# Patient Record
Sex: MALE | Race: WHITE | NOT HISPANIC OR LATINO | Employment: STUDENT | URBAN - METROPOLITAN AREA
[De-identification: names, ages, dates, MRNs, and addresses within clinical notes are randomized per-mention and may not be internally consistent; named-entity substitution may affect disease eponyms.]

---
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
## 2022-07-20 ENCOUNTER — OFFICE VISIT (OUTPATIENT)
Dept: FAMILY MEDICINE CLINIC | Facility: CLINIC | Age: 22
End: 2022-07-20
Payer: COMMERCIAL

## 2022-07-20 VITALS
TEMPERATURE: 97.3 F | DIASTOLIC BLOOD PRESSURE: 78 MMHG | HEIGHT: 73 IN | WEIGHT: 242 LBS | BODY MASS INDEX: 32.07 KG/M2 | HEART RATE: 76 BPM | SYSTOLIC BLOOD PRESSURE: 116 MMHG | OXYGEN SATURATION: 97 %

## 2022-07-20 DIAGNOSIS — F41.9 ANXIETY: Primary | ICD-10-CM

## 2022-07-20 DIAGNOSIS — R53.83 OTHER FATIGUE: ICD-10-CM

## 2022-07-20 DIAGNOSIS — F33.0 MILD EPISODE OF RECURRENT MAJOR DEPRESSIVE DISORDER (HCC): ICD-10-CM

## 2022-07-20 DIAGNOSIS — Z13.220 SCREENING, LIPID: ICD-10-CM

## 2022-07-20 DIAGNOSIS — Z13.1 SCREENING FOR DIABETES MELLITUS: ICD-10-CM

## 2022-07-20 DIAGNOSIS — Z23 NEED FOR TDAP VACCINATION: ICD-10-CM

## 2022-07-20 PROBLEM — J45.20 MILD INTERMITTENT ASTHMA: Status: RESOLVED | Noted: 2017-07-11 | Resolved: 2022-07-20

## 2022-07-20 PROBLEM — J45.20 MILD INTERMITTENT ASTHMA: Status: ACTIVE | Noted: 2017-07-11

## 2022-07-20 PROCEDURE — 90471 IMMUNIZATION ADMIN: CPT

## 2022-07-20 PROCEDURE — 90715 TDAP VACCINE 7 YRS/> IM: CPT

## 2022-07-20 PROCEDURE — 99204 OFFICE O/P NEW MOD 45 MIN: CPT | Performed by: FAMILY MEDICINE

## 2022-07-20 RX ORDER — FLUOXETINE HYDROCHLORIDE 20 MG/1
20 CAPSULE ORAL DAILY
Qty: 30 CAPSULE | Refills: 1 | Status: SHIPPED | OUTPATIENT
Start: 2022-07-20 | End: 2022-09-13

## 2022-07-20 NOTE — PROGRESS NOTES
Wilfred Esquivelt 2000 male MRN: [id number]      ASSESSMENT/PLAN  Problem List Items Addressed This Visit        Other    Anxiety - Primary    Relevant Medications    FLUoxetine (PROzac) 20 mg capsule    Other Relevant Orders    TSH, 3rd generation with Free T4 reflex    Ambulatory Referral to [facility]    Mild episode of recurrent major depressive disorder (HCC)    Relevant Medications    FLUoxetine (PROzac) 20 mg capsule    Other Relevant Orders    TSH, 3rd generation with Free T4 reflex    Ambulatory Referral to [facility]      Other Visit Diagnoses     Screening for diabetes mellitus        Relevant Orders    Comprehensive metabolic panel    Screening, lipid        Relevant Orders    Lipid panel    Other fatigue        Relevant Orders    CBC and differential    Need for Tdap vaccination        Relevant Orders    TDAP VACCINE GREATER THAN OR EQUAL TO 8YO IM (Completed)        Reviewed various options for management including therapy and medication  Pt agreeable to both  Refer to in office LCSW, Start Prozac  Discussed possible ADRs including GI upset, somnolence, initial worsening of symptoms  Reviewed delayed onset to max therapeutic potential  F/u in 4 weeks to monitor, to call if not tolerating prior  Depression Screening Follow-up Plan: Patient's depression screening was positive with a PHQ-2 score of   Their PHQ-9 score was 22  Patient assessed for underlying major depression  They have no active suicidal ideations  Brief counseling provided and recommend additional follow-up/re-evaluation next office visit  Pt denies any suicidal plans/attempts  Has thoughts due to feeling "worthless" due to not having job/spending his brother's money  Check TSH       BP WNL   CMP + Lipids to screen for DM, HLD   Defers STD screenings   Vaccinations: HPV -- thinking about it, TDap given, COVID UTD   Encouraged regular physical activity, varied diet, and regular dental/eye exams Future Appointments   Date Time Provider Matheus Bassett   8/11/2022  3:00 PM [address] [facility]   8/17/2022 11:00 AM DO [facility]          SUBJECTIVE  CC: Annual Exam      HPI:  Rajeev Mcbride is a 20 y o  male who presents with Mom to establish care  History reviewed and updated as below  Symptoms of depression/anxiety have been present for some time, but seem to worsening over the past 3 months   Sleeping a lot, poor motivation, socially isolating, irritable, having panic attacks (heart racing, fidgeting/tremors, abdominal cramping/vomiting)   Was previously diagnosed with ADHD and was on medications -- didn't note any improvement in school performance   Also had behavioral issues, Mom didn't feel safe -- had to move in with friend and his parents for about 1 year, tried to move back with parents again, but behavioral issues continued, so he moved out again   Mom feels he had an addiction to XBox   Has not been able to keep a job for [employer]   Used up his savings to maintain an old car, had fines due to not keeping registration/inspection up to date     Review of Systems   Constitutional: Negative for unexpected weight change  HENT: Negative for congestion, ear pain, rhinorrhea and sore throat  Eyes: Negative for visual disturbance  Respiratory: Negative for cough and shortness of breath  Cardiovascular: Positive for palpitations  Negative for chest pain and leg swelling  Gastrointestinal: Positive for vomiting (with panic attacks )  Negative for abdominal pain, constipation and diarrhea  Endocrine: Negative for polyuria  Genitourinary: Negative for dysuria  Neurological: Negative for dizziness and headaches  Psychiatric/Behavioral: Negative for sleep disturbance   The patient is nervous/anxious          (+) poor motivation  (+) irritable  (+) social isolation       Historical Information   The patient history was reviewed and updated as follows:    History reviewed  No pertinent past medical history  Past Surgical History:   Procedure Laterality Date    ADENOIDECTOMY      TYMPANOSTOMY TUBE PLACEMENT       Family History   Adopted: Yes   Problem Relation Age of Onset    Depression Mother     Anxiety disorder Mother       Social History   Social History     Substance and Sexual Activity   Alcohol Use Not Currently     Social History     Substance and Sexual Activity   Drug Use Yes    Types: Marijuana     Social History     Tobacco Use   Smoking Status Former Smoker   Smokeless Tobacco Never Used       Medications:     Current Outpatient Medications:     FLUoxetine (PROzac) 20 mg capsule, Take 1 capsule (20 mg total) by mouth daily, Disp: 30 capsule, Rfl: 1  No Known Allergies    OBJECTIVE    Vitals:   Vitals:    07/20/22 1421   BP: 116/78   BP Location: Left arm   Patient Position: Sitting   Cuff Size: Large   Pulse: 76   Temp: (!) 97 3 °F (36 3 °C)   SpO2: 97%   Weight: 110 kg (242 lb)   Height: 6' 1" (1 854 m)           Physical Exam  Vitals and nursing note reviewed  Constitutional:       General: He is not in acute distress  Appearance: Normal appearance  HENT:      Head: Normocephalic and atraumatic  Right Ear: Tympanic membrane, ear canal and external ear normal       Left Ear: Tympanic membrane, ear canal and external ear normal       Nose: Nose normal       Mouth/Throat:      Mouth: Mucous membranes are moist       Pharynx: No oropharyngeal exudate or posterior oropharyngeal erythema  Eyes:      Conjunctiva/sclera: Conjunctivae normal    Cardiovascular:      Rate and Rhythm: Normal rate and regular rhythm  Pulmonary:      Effort: Pulmonary effort is normal  No respiratory distress  Breath sounds: Normal breath sounds  Abdominal:      General: Bowel sounds are normal  There is no distension  Palpations: Abdomen is soft  Tenderness: There is no abdominal tenderness     Musculoskeletal:      Right lower leg: No edema  Left lower leg: No edema  Lymphadenopathy:      Cervical: No cervical adenopathy  Skin:     General: Skin is warm and dry  Neurological:      General: No focal deficit present  Mental Status: He is alert     Psychiatric:         Mood and Affect: Mood normal                     Elan Giovanny, DO  [facility]  [address] [facility]   7/20/2022  4:04 PM

## 2022-07-27 ENCOUNTER — TELEPHONE (OUTPATIENT)
Dept: PSYCHIATRY | Facility: CLINIC | Age: 22
End: 2022-07-27

## 2022-07-27 DIAGNOSIS — E83.52 HYPERCALCEMIA: ICD-10-CM

## 2022-07-27 DIAGNOSIS — R79.89 ELEVATED SERUM CREATININE: Primary | ICD-10-CM

## 2022-07-27 LAB
ALBUMIN SERPL-MCNC: 4.8 G/DL (ref 4.1–5.2)
ALBUMIN/GLOB SERPL: 1.8 {RATIO} (ref 1.2–2.2)
ALP SERPL-CCNC: 64 IU/L (ref 44–121)
ALT SERPL-CCNC: 18 IU/L (ref 0–44)
AST SERPL-CCNC: 15 IU/L (ref 0–40)
BASOPHILS # BLD AUTO: 0.1 X10E3/UL (ref 0–0.2)
BASOPHILS NFR BLD AUTO: 1 %
BILIRUB SERPL-MCNC: 0.9 MG/DL (ref 0–1.2)
BUN SERPL-MCNC: 12 MG/DL (ref 6–20)
BUN/CREAT SERPL: 9 (ref 9–20)
CALCIUM SERPL-MCNC: 10.3 MG/DL (ref 8.7–10.2)
CHLORIDE SERPL-SCNC: 99 MMOL/L (ref 96–106)
CHOLEST SERPL-MCNC: 123 MG/DL (ref 100–199)
CO2 SERPL-SCNC: 24 MMOL/L (ref 20–29)
CREAT SERPL-MCNC: 1.28 MG/DL (ref 0.76–1.27)
EGFR: 81 ML/MIN/1.73
EOSINOPHIL # BLD AUTO: 0.3 X10E3/UL (ref 0–0.4)
EOSINOPHIL NFR BLD AUTO: 4 %
ERYTHROCYTE [DISTWIDTH] IN BLOOD BY AUTOMATED COUNT: 12.5 % (ref 11.6–15.4)
GLOBULIN SER-MCNC: 2.7 G/DL (ref 1.5–4.5)
GLUCOSE SERPL-MCNC: 70 MG/DL (ref 65–99)
HCT VFR BLD AUTO: 46.2 % (ref 37.5–51)
HDLC SERPL-MCNC: 29 MG/DL
HGB BLD-MCNC: 15.7 G/DL (ref 13–17.7)
IMM GRANULOCYTES # BLD: 0 X10E3/UL (ref 0–0.1)
IMM GRANULOCYTES NFR BLD: 0 %
LDLC SERPL CALC-MCNC: 79 MG/DL (ref 0–99)
LYMPHOCYTES # BLD AUTO: 1.8 X10E3/UL (ref 0.7–3.1)
LYMPHOCYTES NFR BLD AUTO: 26 %
MCH RBC QN AUTO: 30.3 PG (ref 26.6–33)
MCHC RBC AUTO-ENTMCNC: 34 G/DL (ref 31.5–35.7)
MCV RBC AUTO: 89 FL (ref 79–97)
MONOCYTES # BLD AUTO: 0.7 X10E3/UL (ref 0.1–0.9)
MONOCYTES NFR BLD AUTO: 10 %
NEUTROPHILS # BLD AUTO: 4.3 X10E3/UL (ref 1.4–7)
NEUTROPHILS NFR BLD AUTO: 59 %
PLATELET # BLD AUTO: 289 X10E3/UL (ref 150–450)
POTASSIUM SERPL-SCNC: 4.1 MMOL/L (ref 3.5–5.2)
PROT SERPL-MCNC: 7.5 G/DL (ref 6–8.5)
RBC # BLD AUTO: 5.19 X10E6/UL (ref 4.14–5.8)
SL AMB VLDL CHOLESTEROL CALC: 15 MG/DL (ref 5–40)
SODIUM SERPL-SCNC: 140 MMOL/L (ref 134–144)
TRIGL SERPL-MCNC: 70 MG/DL (ref 0–149)
TSH SERPL DL<=0.005 MIU/L-ACNC: 1.53 UIU/ML (ref 0.45–4.5)
WBC # BLD AUTO: 7.2 X10E3/UL (ref 3.4–10.8)

## 2022-08-02 ENCOUNTER — TELEPHONE (OUTPATIENT)
Dept: PSYCHIATRY | Facility: CLINIC | Age: 22
End: 2022-08-02

## 2022-08-11 ENCOUNTER — SOCIAL WORK (OUTPATIENT)
Dept: BEHAVIORAL/MENTAL HEALTH CLINIC | Facility: CLINIC | Age: 22
End: 2022-08-11
Payer: COMMERCIAL

## 2022-08-11 DIAGNOSIS — F33.0 MILD EPISODE OF RECURRENT MAJOR DEPRESSIVE DISORDER (HCC): Primary | ICD-10-CM

## 2022-08-11 DIAGNOSIS — F41.9 ANXIETY: ICD-10-CM

## 2022-08-11 PROCEDURE — 90791 PSYCH DIAGNOSTIC EVALUATION: CPT | Performed by: SOCIAL WORKER
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
## 2022-08-17 ENCOUNTER — OFFICE VISIT (OUTPATIENT)
Dept: FAMILY MEDICINE CLINIC | Facility: CLINIC | Age: 22
End: 2022-08-17
Payer: COMMERCIAL

## 2022-08-17 VITALS
DIASTOLIC BLOOD PRESSURE: 64 MMHG | SYSTOLIC BLOOD PRESSURE: 108 MMHG | HEART RATE: 63 BPM | WEIGHT: 246 LBS | OXYGEN SATURATION: 96 % | BODY MASS INDEX: 32.6 KG/M2 | HEIGHT: 73 IN

## 2022-08-17 DIAGNOSIS — F41.9 ANXIETY: ICD-10-CM

## 2022-08-17 DIAGNOSIS — F33.0 MILD EPISODE OF RECURRENT MAJOR DEPRESSIVE DISORDER (HCC): Primary | ICD-10-CM

## 2022-08-17 PROCEDURE — 99213 OFFICE O/P EST LOW 20 MIN: CPT | Performed by: FAMILY MEDICINE

## 2022-08-17 NOTE — PROGRESS NOTES
Arlen Alvarez 2000 male MRN: [id number]      ASSESSMENT/PLAN  Problem List Items Addressed This Visit        Other    Anxiety    Mild episode of recurrent major depressive disorder ([address] ) - Primary        Discussed options for management including continuing current dosing vs trial of increase vs switch to another medication  Pt would like to continue 20 mg daily -- will f/u in 4 weeks to monitor  Future Appointments   Date Time Provider Matheus Bassett   9/8/2022  2:00 PM [address]  CC: Follow-up (Patient states he is doing well - reports mood swings )      HPI:  Arlen Alvarez is a 20 y o  male who presents for medication follow up  Initiated on Prozac -- feels very tired, easily irritated   "some days are better than others" -- notes some improvement in motivation "things are starting to turn around"   Established with in office LCSW     Review of Systems   Constitutional: Positive for fatigue  Psychiatric/Behavioral: Positive for agitation  Historical Information   The patient history was reviewed and updated as follows:    History reviewed  No pertinent past medical history    Past Surgical History:   Procedure Laterality Date    ADENOIDECTOMY      TYMPANOSTOMY TUBE PLACEMENT       Family History   Adopted: Yes   Problem Relation Age of Onset    Depression Mother     Anxiety disorder Mother       Social History   Social History     Substance and Sexual Activity   Alcohol Use Not Currently     Social History     Substance and Sexual Activity   Drug Use Yes    Types: Marijuana     Social History     Tobacco Use   Smoking Status Former Smoker   Smokeless Tobacco Never Used       Medications:     Current Outpatient Medications:     FLUoxetine (PROzac) 20 mg capsule, Take 1 capsule (20 mg total) by mouth daily, Disp: 30 capsule, Rfl: 1  No Known Allergies    OBJECTIVE    Vitals:   Vitals:    08/17/22 1102   BP: 108/64   Pulse: 63   SpO2: 96% Weight: 112 kg (246 lb)   Height: 6' 1" (1 854 m)           Physical Exam  Vitals and nursing note reviewed  Constitutional:       General: He is not in acute distress  Appearance: Normal appearance  HENT:      Head: Normocephalic and atraumatic  Pulmonary:      Effort: Pulmonary effort is normal  No respiratory distress  Neurological:      General: No focal deficit present  Mental Status: He is alert     Psychiatric:         Mood and Affect: Mood normal                     Vanessa Avila DO  [facility]   8/17/2022  11:13 AM

## 2022-09-09 ENCOUNTER — SOCIAL WORK (OUTPATIENT)
Dept: BEHAVIORAL/MENTAL HEALTH CLINIC | Facility: CLINIC | Age: 22
End: 2022-09-09
Payer: COMMERCIAL

## 2022-09-09 DIAGNOSIS — F41.9 ANXIETY: ICD-10-CM

## 2022-09-09 DIAGNOSIS — F33.0 MILD EPISODE OF RECURRENT MAJOR DEPRESSIVE DISORDER (HCC): Primary | ICD-10-CM

## 2022-09-09 PROCEDURE — 90834 PSYTX W PT 45 MINUTES: CPT | Performed by: SOCIAL WORKER

## 2022-09-09 NOTE — PSYCH
Start Time 11:05AM-11:50AM  Psychotherapy Provided: Individual Psychotherapy 45 minutes   Length of time in session: 45 minutes, follow up in 3-4 week    Encounter Diagnosis     ICD-10-CM    1  Anxiety  F41 9 Ambulatory Referral to [facility]   2  Mild episode of recurrent major depressive disorder ([facility] [address] )  F33 0 Ambulatory Referral to [facility]rap[facility]   Goals addressed in session: Goal 1   Pain:  None    none  0  Current suicide risk : Low   D: Jt Little did get the job he applied for  He is comfortable with starting as he has done this job before  He feels like this is easy money  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, 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  He is starting out at 15 and hour  Discussion of his feelings towards his medications  He feels like it is a stressful time right now  They are going to be moving  He is going to get his own insurance so that his parents do not have to pay for insurance as his dad is retiring  We discussed his relationship and how the breakup was handled  He states that they were arguing all the time and they had different schedules  He admits to missing her and then he states that he actually misses the relationship  He does not feel like he is ready to date until he gets his car on the road, and gets money in his pocket  We processed  He states that his mother threw him out when he was 24  He has a need to be heard as it appears as thought no one listens to him  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  He and his brother have always gotten into it when they were younger  They started getting along when they were in high school  He states that his brother was always angry and now he is not  He discussed his birth mom and how she kept in touch with his adoptive mother through his whole life      Cosmo Brown appears to be making progress as evidenced by his talking about his adoption, and how he felt about the situation  His anxiety is about the same and he feels it will be until he starts work and gets paid  Durwood Jaswant will fill out the application for the new house, and he will follow up with this writer in 3-4 weeks  Behavioral Health Treatment Plan [facility]: Diagnosis and Treatment Plan explained to Hai Quintanilla relates understanding diagnosis and is agreeable to Treatment Plan   Yes

## 2022-09-12 DIAGNOSIS — F33.0 MILD EPISODE OF RECURRENT MAJOR DEPRESSIVE DISORDER (HCC): ICD-10-CM

## 2022-09-12 DIAGNOSIS — F41.9 ANXIETY: ICD-10-CM

## 2022-09-13 RX ORDER — FLUOXETINE HYDROCHLORIDE 20 MG/1
CAPSULE ORAL
Qty: 30 CAPSULE | Refills: 1 | Status: SHIPPED | OUTPATIENT
Start: 2022-09-13 | End: 2022-10-18

## 2022-10-17 NOTE — PROGRESS NOTES
Era Force 2000 male MRN: [id number]      ASSESSMENT/PLAN  Problem List Items Addressed This Visit        Other    Anxiety    Mild episode of recurrent major depressive disorder ([address] ) - Primary        Will defer further medication at this time, f/u with in office LCSW     Defers flu vaccine, planning to get COVID booster    Future Appointments   Date Time Provider Matheus Bassett   1/17/2023 10:20 AM DO GOY[facility] Bolton  [facility]          SUBJECTIVE  CC: Follow-up (medication)      HPI:  Yen Davidson is a 20 y o  male who presents with his Mom for medication follow up  Continued Prozac 20 mg -- hasn't been taking it regularly, felt like he was getting "snappy", was having mood swings and didn't feel comfortable continuing to take the medication   Would like to just continue with therapy alone for now       Review of Systems   Psychiatric/Behavioral: Negative for self-injury and suicidal ideas  The patient is nervous/anxious  Historical Information   The patient history was reviewed and updated as follows:    History reviewed  No pertinent past medical history  Past Surgical History:   Procedure Laterality Date   • ADENOIDECTOMY     • TYMPANOSTOMY TUBE PLACEMENT       Family History   Adopted: Yes   Problem Relation Age of Onset   • Depression Mother    • Anxiety disorder Mother       Social History   Social History     Substance and Sexual Activity   Alcohol Use Not Currently     Social History     Substance and Sexual Activity   Drug Use Yes   • Types: Marijuana     Social History     Tobacco Use   Smoking Status Former Smoker   Smokeless Tobacco Never Used       Medications:   No current outpatient medications on file    No Known Allergies    OBJECTIVE    Vitals:   Vitals:    10/18/22 1100   BP: 132/70   BP Location: Left arm   Patient Position: Sitting   Cuff Size: Large   Pulse: 84   Temp: 98 4 °F (36 9 °C)   SpO2: 98%   Weight: 113 kg (249 lb)   Height: 6' 1" (1 854 m) Physical Exam  Vitals and nursing note reviewed  Constitutional:       General: He is not in acute distress  Appearance: Normal appearance  HENT:      Head: Normocephalic and atraumatic  Pulmonary:      Effort: Pulmonary effort is normal  No respiratory distress  Neurological:      General: No focal deficit present  Mental Status: He is alert     Psychiatric:         Mood and Affect: Mood normal                     Vanessa So [address]  [address] [facility]   10/18/2022  11:57 AM

## 2022-10-18 ENCOUNTER — OFFICE VISIT (OUTPATIENT)
Dept: FAMILY MEDICINE CLINIC | Facility: CLINIC | Age: 22
End: 2022-10-18
Payer: COMMERCIAL

## 2022-10-18 VITALS
HEART RATE: 84 BPM | SYSTOLIC BLOOD PRESSURE: 132 MMHG | OXYGEN SATURATION: 98 % | DIASTOLIC BLOOD PRESSURE: 70 MMHG | HEIGHT: 73 IN | WEIGHT: 249 LBS | BODY MASS INDEX: 33 KG/M2 | TEMPERATURE: 98.4 F

## 2022-10-18 DIAGNOSIS — F33.0 MILD EPISODE OF RECURRENT MAJOR DEPRESSIVE DISORDER (HCC): Primary | ICD-10-CM

## 2022-10-18 DIAGNOSIS — F41.9 ANXIETY: ICD-10-CM

## 2022-10-18 PROCEDURE — 99213 OFFICE O/P EST LOW 20 MIN: CPT | Performed by: FAMILY MEDICINE

## 2023-01-06 ENCOUNTER — RA CDI HCC (OUTPATIENT)
Dept: OTHER | Facility: HOSPITAL | Age: 23
End: 2023-01-06

## 2023-01-06 NOTE — PROGRESS NOTES
[facility]  coding opportunities       Chart reviewed, no opportunity found: CHART REVIEWED, NO OPPORTUNITY FOUND        Patients Insurance        Commercial Insurance: Endy Charles

## 2024-02-26 NOTE — PROGRESS NOTES
"Koffi Mckee 2000 male MRN: [id number]      ASSESSMENT/PLAN  Problem List Items Addressed This Visit        Other    Mild episode of recurrent major depressive disorder (HCC) - Primary    Relevant Medications    FLUoxetine (PROzac) 20 mg capsule    Anxiety    Relevant Medications    FLUoxetine (PROzac) 20 mg capsule   Other Visit Diagnoses     Healthcare maintenance            Recurrent depression/anxiety symptoms -- re-start Prozac. Discussed possible ADRs including GI upset, somnolence, initial worsening of symptoms, sexual dysfunction. Reviewed delayed onset to max therapeutic potential. F/u in 4 weeks to monitor, to call if not tolerating prior.   Depression Screening Follow-up Plan: Patient's depression screening was positive with a PHQ-2 score of . Their PHQ-9 score was 10. Patient assessed for underlying major depression. They have no active suicidal ideations. Brief counseling provided and recommend additional follow-up/re-evaluation next office visit.      Health Maintenance:   BP WNL   Defers screening labs/STD screening   Vaccinations: TDap UTD, Flu deferred, COVID completed primary + boosters defers further boosters  Encouraged regular physical activity, varied diet, and regular dental/eye exams         Future Appointments   Date Time Provider Department Center   3/29/2024 10:00 AM DO GOY[facility]            SUBJECTIVE  CC: Anxiety (Patient looking to get back on medication) and Depression      HPI:  Koffi Mckee is a 23 y.o. male who presents with his girlfriend for annual physical. History reviewed and updated as below.    Is interested in going back on Prozac -- he was previously taking this and did well, but he wanted to \"be ok\" without medication so he stopped. He had an anxiety attack while driving recently, and his depression screening is somewhat positive, though no SI/HI.     Review of Systems   Constitutional:  Negative for unexpected weight change.   HENT:  " "Negative for congestion, ear pain, rhinorrhea and sore throat.    Eyes:  Negative for visual disturbance.   Respiratory:  Negative for cough and shortness of breath.    Cardiovascular:  Negative for chest pain, palpitations and leg swelling.   Gastrointestinal:  Negative for abdominal pain, blood in stool, constipation and diarrhea.   Endocrine: Negative for polyuria.   Genitourinary:  Negative for dysuria and hematuria.   Neurological:  Negative for dizziness and headaches.   Psychiatric/Behavioral:  Negative for sleep disturbance.        Historical Information   The patient history was reviewed and updated as follows:    History reviewed. No pertinent past medical history.  Past Surgical History:   Procedure Laterality Date   • ADENOIDECTOMY     • TYMPANOSTOMY TUBE PLACEMENT       Family History   Adopted: Yes   Problem Relation Age of Onset   • Depression Mother    • Anxiety disorder Mother       Social History   Social History     Substance and Sexual Activity   Alcohol Use Not Currently     Social History     Substance and Sexual Activity   Drug Use Yes   • Types: Marijuana     Social History     Tobacco Use   Smoking Status Former   Smokeless Tobacco Never       Medications:     Current Outpatient Medications:   •  FLUoxetine (PROzac) 20 mg capsule, Take 1 capsule (20 mg total) by mouth daily, Disp: 30 capsule, Rfl: 1  No Known Allergies    OBJECTIVE    Vitals:   Vitals:    02/27/24 0912   BP: 123/78   Pulse: 89   Temp: 98.2 °F (36.8 °C)   SpO2: 98%   Weight: 113 kg (249 lb)   Height: 6' 1\" (1.854 m)           Physical Exam  Vitals and nursing note reviewed.   Constitutional:       General: He is not in acute distress.     Appearance: Normal appearance.   HENT:      Head: Normocephalic and atraumatic.      Right Ear: Tympanic membrane, ear canal and external ear normal.      Left Ear: Tympanic membrane, ear canal and external ear normal.      Nose: Nose normal.      Mouth/Throat:      Mouth: Mucous membranes " are moist.      Pharynx: No oropharyngeal exudate or posterior oropharyngeal erythema.   Eyes:      Conjunctiva/sclera: Conjunctivae normal.   Cardiovascular:      Rate and Rhythm: Normal rate and regular rhythm.   Pulmonary:      Effort: Pulmonary effort is normal. No respiratory distress.      Breath sounds: Normal breath sounds.   Abdominal:      General: Bowel sounds are normal. There is no distension.      Palpations: Abdomen is soft.      Tenderness: There is no abdominal tenderness.   Musculoskeletal:      Right lower leg: No edema.      Left lower leg: No edema.   Lymphadenopathy:      Cervical: No cervical adenopathy.   Skin:     General: Skin is warm and dry.   Neurological:      Mental Status: He is alert.      Comments: Grossly intact   Psychiatric:         Mood and Affect: Mood normal.                    Vanessa Avila DO  [facility]   2/27/2024  9:41 AM

## 2024-02-27 ENCOUNTER — OFFICE VISIT (OUTPATIENT)
Dept: FAMILY MEDICINE CLINIC | Facility: CLINIC | Age: 24
End: 2024-02-27
Payer: COMMERCIAL

## 2024-02-27 VITALS
BODY MASS INDEX: 33 KG/M2 | WEIGHT: 249 LBS | SYSTOLIC BLOOD PRESSURE: 123 MMHG | OXYGEN SATURATION: 98 % | DIASTOLIC BLOOD PRESSURE: 78 MMHG | HEART RATE: 89 BPM | TEMPERATURE: 98.2 F | HEIGHT: 73 IN

## 2024-02-27 DIAGNOSIS — F41.9 ANXIETY: ICD-10-CM

## 2024-02-27 DIAGNOSIS — Z00.00 HEALTHCARE MAINTENANCE: ICD-10-CM

## 2024-02-27 DIAGNOSIS — F33.0 MILD EPISODE OF RECURRENT MAJOR DEPRESSIVE DISORDER (HCC): Primary | ICD-10-CM

## 2024-02-27 PROCEDURE — 99214 OFFICE O/P EST MOD 30 MIN: CPT | Performed by: FAMILY MEDICINE

## 2024-02-27 PROCEDURE — 99395 PREV VISIT EST AGE 18-39: CPT | Performed by: FAMILY MEDICINE

## 2024-02-27 RX ORDER — FLUOXETINE HYDROCHLORIDE 20 MG/1
20 CAPSULE ORAL DAILY
Qty: 30 CAPSULE | Refills: 1 | Status: SHIPPED | OUTPATIENT
Start: 2024-02-27